# Patient Record
Sex: FEMALE | Race: ASIAN | NOT HISPANIC OR LATINO | URBAN - METROPOLITAN AREA
[De-identification: names, ages, dates, MRNs, and addresses within clinical notes are randomized per-mention and may not be internally consistent; named-entity substitution may affect disease eponyms.]

---

## 2024-10-20 ENCOUNTER — INPATIENT (INPATIENT)
Facility: HOSPITAL | Age: 49
LOS: 1 days | Discharge: HOME | End: 2024-10-22
Attending: HOSPITALIST | Admitting: HOSPITALIST
Payer: COMMERCIAL

## 2024-10-20 VITALS
TEMPERATURE: 98 F | DIASTOLIC BLOOD PRESSURE: 96 MMHG | OXYGEN SATURATION: 100 % | SYSTOLIC BLOOD PRESSURE: 166 MMHG | RESPIRATION RATE: 15 BRPM | HEART RATE: 87 BPM | WEIGHT: 134.92 LBS

## 2024-10-20 PROCEDURE — 99285 EMERGENCY DEPT VISIT HI MDM: CPT

## 2024-10-20 NOTE — ED ADULT TRIAGE NOTE - CHIEF COMPLAINT QUOTE
C/O SOB and chest pain after hearing "bad news" denies SI/HI. No complaints of  headache, nausea, dizziness, vomiting, fever, chills verbalized. phx HTN HLD

## 2024-10-21 DIAGNOSIS — E78.5 HYPERLIPIDEMIA, UNSPECIFIED: ICD-10-CM

## 2024-10-21 DIAGNOSIS — D72.829 ELEVATED WHITE BLOOD CELL COUNT, UNSPECIFIED: ICD-10-CM

## 2024-10-21 DIAGNOSIS — R51.9 HEADACHE, UNSPECIFIED: ICD-10-CM

## 2024-10-21 DIAGNOSIS — R07.9 CHEST PAIN, UNSPECIFIED: ICD-10-CM

## 2024-10-21 DIAGNOSIS — I10 ESSENTIAL (PRIMARY) HYPERTENSION: ICD-10-CM

## 2024-10-21 LAB
A1C WITH ESTIMATED AVERAGE GLUCOSE RESULT: 6.1 % — HIGH (ref 4–5.6)
ADD ON TEST-SPECIMEN IN LAB: SIGNIFICANT CHANGE UP
ALBUMIN SERPL ELPH-MCNC: 4.4 G/DL — SIGNIFICANT CHANGE UP (ref 3.3–5)
ALP SERPL-CCNC: 88 U/L — SIGNIFICANT CHANGE UP (ref 40–120)
ALT FLD-CCNC: 21 U/L — SIGNIFICANT CHANGE UP (ref 4–33)
ANION GAP SERPL CALC-SCNC: 11 MMOL/L — SIGNIFICANT CHANGE UP (ref 7–14)
ANION GAP SERPL CALC-SCNC: 16 MMOL/L — HIGH (ref 7–14)
APPEARANCE UR: CLEAR — SIGNIFICANT CHANGE UP
AST SERPL-CCNC: 21 U/L — SIGNIFICANT CHANGE UP (ref 4–32)
BASOPHILS # BLD AUTO: 0.08 K/UL — SIGNIFICANT CHANGE UP (ref 0–0.2)
BASOPHILS NFR BLD AUTO: 0.5 % — SIGNIFICANT CHANGE UP (ref 0–2)
BILIRUB SERPL-MCNC: 0.2 MG/DL — SIGNIFICANT CHANGE UP (ref 0.2–1.2)
BILIRUB UR-MCNC: NEGATIVE — SIGNIFICANT CHANGE UP
BUN SERPL-MCNC: 11 MG/DL — SIGNIFICANT CHANGE UP (ref 7–23)
BUN SERPL-MCNC: 12 MG/DL — SIGNIFICANT CHANGE UP (ref 7–23)
CALCIUM SERPL-MCNC: 8.9 MG/DL — SIGNIFICANT CHANGE UP (ref 8.4–10.5)
CALCIUM SERPL-MCNC: 9.8 MG/DL — SIGNIFICANT CHANGE UP (ref 8.4–10.5)
CHLORIDE SERPL-SCNC: 104 MMOL/L — SIGNIFICANT CHANGE UP (ref 98–107)
CHLORIDE SERPL-SCNC: 105 MMOL/L — SIGNIFICANT CHANGE UP (ref 98–107)
CK MB CFR SERPL CALC: 5 NG/ML — HIGH
CO2 SERPL-SCNC: 20 MMOL/L — LOW (ref 22–31)
CO2 SERPL-SCNC: 24 MMOL/L — SIGNIFICANT CHANGE UP (ref 22–31)
COLOR SPEC: YELLOW — SIGNIFICANT CHANGE UP
CREAT SERPL-MCNC: 0.65 MG/DL — SIGNIFICANT CHANGE UP (ref 0.5–1.3)
CREAT SERPL-MCNC: 0.65 MG/DL — SIGNIFICANT CHANGE UP (ref 0.5–1.3)
DIFF PNL FLD: NEGATIVE — SIGNIFICANT CHANGE UP
EGFR: 107 ML/MIN/1.73M2 — SIGNIFICANT CHANGE UP
EGFR: 107 ML/MIN/1.73M2 — SIGNIFICANT CHANGE UP
EOSINOPHIL # BLD AUTO: 0.13 K/UL — SIGNIFICANT CHANGE UP (ref 0–0.5)
EOSINOPHIL NFR BLD AUTO: 0.7 % — SIGNIFICANT CHANGE UP (ref 0–6)
ESTIMATED AVERAGE GLUCOSE: 128 — SIGNIFICANT CHANGE UP
GLUCOSE SERPL-MCNC: 129 MG/DL — HIGH (ref 70–99)
GLUCOSE SERPL-MCNC: 136 MG/DL — HIGH (ref 70–99)
GLUCOSE UR QL: NEGATIVE MG/DL — SIGNIFICANT CHANGE UP
HCT VFR BLD CALC: 39.5 % — SIGNIFICANT CHANGE UP (ref 34.5–45)
HCT VFR BLD CALC: 40.2 % — SIGNIFICANT CHANGE UP (ref 34.5–45)
HGB BLD-MCNC: 13 G/DL — SIGNIFICANT CHANGE UP (ref 11.5–15.5)
HGB BLD-MCNC: 13 G/DL — SIGNIFICANT CHANGE UP (ref 11.5–15.5)
IANC: 13.39 K/UL — HIGH (ref 1.8–7.4)
IMM GRANULOCYTES NFR BLD AUTO: 0.5 % — SIGNIFICANT CHANGE UP (ref 0–0.9)
KETONES UR-MCNC: NEGATIVE MG/DL — SIGNIFICANT CHANGE UP
LEUKOCYTE ESTERASE UR-ACNC: NEGATIVE — SIGNIFICANT CHANGE UP
LYMPHOCYTES # BLD AUTO: 17.5 % — SIGNIFICANT CHANGE UP (ref 13–44)
LYMPHOCYTES # BLD AUTO: 3.08 K/UL — SIGNIFICANT CHANGE UP (ref 1–3.3)
MAGNESIUM SERPL-MCNC: 2.1 MG/DL — SIGNIFICANT CHANGE UP (ref 1.6–2.6)
MCHC RBC-ENTMCNC: 26.7 PG — LOW (ref 27–34)
MCHC RBC-ENTMCNC: 26.9 PG — LOW (ref 27–34)
MCHC RBC-ENTMCNC: 32.3 GM/DL — SIGNIFICANT CHANGE UP (ref 32–36)
MCHC RBC-ENTMCNC: 32.9 GM/DL — SIGNIFICANT CHANGE UP (ref 32–36)
MCV RBC AUTO: 81.3 FL — SIGNIFICANT CHANGE UP (ref 80–100)
MCV RBC AUTO: 83.1 FL — SIGNIFICANT CHANGE UP (ref 80–100)
MONOCYTES # BLD AUTO: 0.85 K/UL — SIGNIFICANT CHANGE UP (ref 0–0.9)
MONOCYTES NFR BLD AUTO: 4.8 % — SIGNIFICANT CHANGE UP (ref 2–14)
NEUTROPHILS # BLD AUTO: 13.39 K/UL — HIGH (ref 1.8–7.4)
NEUTROPHILS NFR BLD AUTO: 76 % — SIGNIFICANT CHANGE UP (ref 43–77)
NITRITE UR-MCNC: NEGATIVE — SIGNIFICANT CHANGE UP
NRBC # BLD: 0 /100 WBCS — SIGNIFICANT CHANGE UP (ref 0–0)
NRBC # BLD: 0 /100 WBCS — SIGNIFICANT CHANGE UP (ref 0–0)
NRBC # FLD: 0 K/UL — SIGNIFICANT CHANGE UP (ref 0–0)
NRBC # FLD: 0 K/UL — SIGNIFICANT CHANGE UP (ref 0–0)
PH UR: 6.5 — SIGNIFICANT CHANGE UP (ref 5–8)
PHOSPHATE SERPL-MCNC: 3.4 MG/DL — SIGNIFICANT CHANGE UP (ref 2.5–4.5)
PLATELET # BLD AUTO: 441 K/UL — HIGH (ref 150–400)
PLATELET # BLD AUTO: 494 K/UL — HIGH (ref 150–400)
POTASSIUM SERPL-MCNC: 3.8 MMOL/L — SIGNIFICANT CHANGE UP (ref 3.5–5.3)
POTASSIUM SERPL-MCNC: 4.4 MMOL/L — SIGNIFICANT CHANGE UP (ref 3.5–5.3)
POTASSIUM SERPL-SCNC: 3.8 MMOL/L — SIGNIFICANT CHANGE UP (ref 3.5–5.3)
POTASSIUM SERPL-SCNC: 4.4 MMOL/L — SIGNIFICANT CHANGE UP (ref 3.5–5.3)
PROT SERPL-MCNC: 7.7 G/DL — SIGNIFICANT CHANGE UP (ref 6–8.3)
PROT UR-MCNC: NEGATIVE MG/DL — SIGNIFICANT CHANGE UP
RBC # BLD: 4.84 M/UL — SIGNIFICANT CHANGE UP (ref 3.8–5.2)
RBC # BLD: 4.86 M/UL — SIGNIFICANT CHANGE UP (ref 3.8–5.2)
RBC # FLD: 12.7 % — SIGNIFICANT CHANGE UP (ref 10.3–14.5)
RBC # FLD: 12.9 % — SIGNIFICANT CHANGE UP (ref 10.3–14.5)
SODIUM SERPL-SCNC: 140 MMOL/L — SIGNIFICANT CHANGE UP (ref 135–145)
SODIUM SERPL-SCNC: 140 MMOL/L — SIGNIFICANT CHANGE UP (ref 135–145)
SP GR SPEC: 1.02 — SIGNIFICANT CHANGE UP (ref 1–1.03)
TROPONIN T, HIGH SENSITIVITY RESULT: 34 NG/L — SIGNIFICANT CHANGE UP
TROPONIN T, HIGH SENSITIVITY RESULT: 39 NG/L — SIGNIFICANT CHANGE UP
TROPONIN T, HIGH SENSITIVITY RESULT: 54 NG/L — CRITICAL HIGH
UROBILINOGEN FLD QL: 0.2 MG/DL — SIGNIFICANT CHANGE UP (ref 0.2–1)
WBC # BLD: 10.3 K/UL — SIGNIFICANT CHANGE UP (ref 3.8–10.5)
WBC # BLD: 17.61 K/UL — HIGH (ref 3.8–10.5)
WBC # FLD AUTO: 10.3 K/UL — SIGNIFICANT CHANGE UP (ref 3.8–10.5)
WBC # FLD AUTO: 17.61 K/UL — HIGH (ref 3.8–10.5)

## 2024-10-21 PROCEDURE — 70450 CT HEAD/BRAIN W/O DYE: CPT | Mod: 26,MC

## 2024-10-21 PROCEDURE — 93306 TTE W/DOPPLER COMPLETE: CPT | Mod: 26

## 2024-10-21 PROCEDURE — 71046 X-RAY EXAM CHEST 2 VIEWS: CPT | Mod: 26

## 2024-10-21 RX ORDER — ACETAMINOPHEN 500 MG
650 TABLET ORAL EVERY 6 HOURS
Refills: 0 | Status: DISCONTINUED | OUTPATIENT
Start: 2024-10-21 | End: 2024-10-22

## 2024-10-21 RX ORDER — INFLUENZ VIR VAC TV P-SURF2003 15MCG/.5ML
0.5 SYRINGE (ML) INTRAMUSCULAR ONCE
Refills: 0 | Status: DISCONTINUED | OUTPATIENT
Start: 2024-10-21 | End: 2024-10-22

## 2024-10-21 RX ORDER — METOPROLOL TARTRATE 50 MG
25 TABLET ORAL DAILY
Refills: 0 | Status: DISCONTINUED | OUTPATIENT
Start: 2024-10-21 | End: 2024-10-22

## 2024-10-21 RX ORDER — ASPIRIN/MAG CARB/ALUMINUM AMIN 325 MG
324 TABLET ORAL ONCE
Refills: 0 | Status: COMPLETED | OUTPATIENT
Start: 2024-10-21 | End: 2024-10-21

## 2024-10-21 RX ORDER — METOPROLOL TARTRATE 50 MG
1 TABLET ORAL
Refills: 0 | DISCHARGE

## 2024-10-21 RX ORDER — KETOROLAC TROMETHAMINE 30 MG/ML
30 INJECTION INTRAMUSCULAR; INTRAVENOUS ONCE
Refills: 0 | Status: DISCONTINUED | OUTPATIENT
Start: 2024-10-21 | End: 2024-10-21

## 2024-10-21 RX ADMIN — Medication 25 MILLIGRAM(S): at 12:51

## 2024-10-21 RX ADMIN — Medication 650 MILLIGRAM(S): at 18:07

## 2024-10-21 RX ADMIN — Medication 324 MILLIGRAM(S): at 02:50

## 2024-10-21 RX ADMIN — Medication 650 MILLIGRAM(S): at 10:28

## 2024-10-21 RX ADMIN — Medication 650 MILLIGRAM(S): at 19:07

## 2024-10-21 NOTE — CONSULT NOTE ADULT - NS ATTEND AMEND GEN_ALL_CORE FT
Patient seen and examined. Agree with plan as detailed in PA/NP Note.     Check NST and TTE    Patience Gonzalez MD  Pager: [phone]

## 2024-10-21 NOTE — DISCHARGE NOTE PROVIDER - HOSPITAL COURSE
50 y/o F with PMHx HTN, HLD p/w chest pain and headache today, brought on by emotional stress first trop positive, trending down.  Patient was admitted to telemetry unit for further evaluation. She had a CT of her head in ED for persistent headache which did not show evidence of stroke/bleed.  She had an echocardiogram w/normal EF, no WMA present.  Stress test-----   50 y/o F with PMHx HTN, HLD p/w chest pain and headache today, brought on by emotional stress first trop positive, trending down.  Patient was admitted to telemetry unit for further evaluation. She had a CT of her head in ED for persistent headache which did not show evidence of stroke/bleed.  She had an echocardiogram w/normal EF, no WMA present.  Stress test negative. On 10/22/2024, discussed with cardiology/Dr. Alvarado, patient is medically cleared and optimized for discharge today to home . All medications were reviewed with attending, and any new/required prescriptions were sent to mutually agreed upon pharmacy.

## 2024-10-21 NOTE — ED PROVIDER NOTE - CONSTITUTIONAL, MLM
Refill approved as requested.   normal... Well appearing, awake, alert, oriented to person, place, time/situation and in no apparent distress.

## 2024-10-21 NOTE — ED PROVIDER NOTE - PROGRESS NOTE DETAILS
I spoke to teledoc of the day Dr. Arana. He accepted admission and will see pt tomorrow. he asked I can put admission under evans murray md. Felipa - pt reassesed. chest pain free. headache improved. 12lead x 2 normal.

## 2024-10-21 NOTE — H&P ADULT - PROBLEM SELECTOR PLAN 4
2nd trop negative  defer to cardiology   chest pain free at this time fasting lipid panel  continue atorvastatin

## 2024-10-21 NOTE — ED ADULT NURSE NOTE - OBJECTIVE STATEMENT
Patient received in intake. A&O4. ambulatory. Past medical history of HLD/HTN. Came into ED with complaints of chest pain. States chest pain started after hearing "bad news". Patient appears tearful while answering questions. Respirations even and unlabored. Denies SOB, N/V/D,fevers. Respirations even and unlabored. Denies SI/HI. No signs of acute distress noted. 20g IV placed left hand. labs drawn and sent.

## 2024-10-21 NOTE — H&P ADULT - NSHPPHYSICALEXAM_GEN_ALL_CORE
PHYSICAL EXAM: vital signs noted on Sunrise  in no apparent distress  HEENT: MYRIAM EOMI  Neck: Supple, no JVD, no thyromegaly  Lungs: no wheeze, no crackles  CVS: S1 S2 no M/R/G  Abdomen: no tenderness, no organomegaly, BS present  Neuro: AO x 3 no focal weakness, no sensory abnormalities  Psych: appropriate affect  Skin: warm, dry  Ext: no cyanosis or clubbing, no edema  Msk: no joint swelling or deformities  Back: no CVA tenderness, no kyphosis/scoliosis

## 2024-10-21 NOTE — H&P ADULT - PROBLEM SELECTOR PLAN 2
continue metoprolol  will adjust meds up as needed will monitor  unclear etiology  no evidence of localizing s/s  will repeat   urinalysis

## 2024-10-21 NOTE — CONSULT NOTE ADULT - SUBJECTIVE AND OBJECTIVE BOX
date of consult 10/21/24    HISTORY OF PRESENT ILLNESS: HPI:  48 y/o F with PMHx HTN and HLD p/w chest pain and headache today.  Pt reports at  8 PM this evening, she reviewed upsetting news that her daughter was moving away from home.  Pt reports she very emotional with which she developed generalized headache, 8/10 in intensity, for which she was brought to the emergency department.  Pt reports en route to hospital, she developed chest heaviness and dyspnea. Currently chest pain free, just c/o severe headache    Denies hx CAD, MI, CHF, reports normal cardiac workup with OP Cardiologist 1.5-2 years ago.    PAST MEDICAL & SURGICAL HISTORY:  HTN (hypertension)    HLD (hyperlipidemia)      MEDICATIONS  (STANDING):  atorvastatin 10 milliGRAM(s) Oral at bedtime  influenza   Vaccine 0.5 milliLiter(s) IntraMuscular once  metoprolol succinate ER 25 milliGRAM(s) Oral daily      Allergies  No Known Allergies      FAMILY HISTORY:  Family history of diabetes mellitus (DM) (Father, Mother)  Noncontributory for premature coronary disease or sudden cardiac death    SOCIAL HISTORY:    [ x] Non-smoker  [ ] Smoker  [ ] Alcohol    FLU VACCINE THIS YEAR STARTS IN AUGUST:  [ ] Yes    [ ] No    IF OVER 65 HAVE YOU EVER HAD A PNA VACCINE:  [ ] Yes    [ ] No       [ ] N/A      REVIEW OF SYSTEMS:  [ ]chest pain  [  ]shortness of breath  [  ]palpitations  [  ]syncope  [ ]near syncope [ ]upper extremity weakness   [ ] lower extremity weakness  [  ]diplopia  [  ]altered mental status   [  ]fevers  [ ]chills [ ]nausea  [ ]vomiting  [  ]dysphagia    [ ]abdominal pain  [ ]melena  [ ]BRBPR    [  ]epistaxis  [  ]rash    [ ]lower extremity edema        [ x] All others negative	  [ ] Unable to obtain      LABS:	 	    CARDIAC MARKERS:  CARDIAC MARKERS ( 21 Oct 2024 10:20 )  x     / x     / x     / x     / 5.0 ng/mL  CARDIAC MARKERS ( 21 Oct 2024 00:24 )  x     / x     / x     / x     / 4.8 ng/mL    TROP T 54, 39, 34                          13.0   10.30 )-----------( 441      ( 21 Oct 2024 10:20 )             40.2     140  |  105  |  11  ----------------------------<  129[H]  4.4   |  24  |  0.65    Ca    8.9      21 Oct 2024 10:20  Phos  3.4     10-21  Mg     2.10     10-21    TPro  7.7  /  Alb  4.4  /  TBili  0.2  /  DBili  x   /  AST  21  /  ALT  21  /  AlkPhos  88  10-21    Creatinine Trend: 0.65<--, 0.65<--      PHYSICAL EXAM:  T(C): 36.4 (10-21-24 @ 08:00), Max: 36.9 (10-21-24 @ 04:59)  HR: 59 (10-21-24 @ 08:00) (59 - 87)  BP: 146/90 (10-21-24 @ 08:00) (128/85 - 166/96)  RR: 17 (10-21-24 @ 08:00) (15 - 17)  SpO2: 100% (10-21-24 @ 08:00) (100% - 100%)  Wt(kg): --   BMI (kg/m2): 25.5 (10-21-24 @ 08:00)    Gen: Appears well in NAD  HEENT:  (-)icterus (-)pallor  CV: N S1 S2 1/6 GABBIE (+)2 Pulses B/l  Resp:  Clear to ausculatation B/L, normal effort  GI: (+) BS Soft, NT, ND  Lymph:  (-)Edema, (-)obvious lymphadenopathy  Skin: Warm to touch, Normal turgor  Psych: Appropriate mood and affect    TELEMETRY: 	SR      ECG:  	NSR    < from: CT Head No Cont (10.21.24 @ 01:47) >  IMPRESSION:  No acute intracranial hemorrhage, mass effect, or midline shift.    < end of copied text >      ASSESSMENT/PLAN: 48 y/o F with PMHx HTN and HLD p/w chest pain and headache today.    --Trop T downtrending  --Check TTE and NST  --Cont lipitor for HLD  --unclear why on Toprol XL, cont for now but may need better BP management with different medications  --Headache ongoing, denies hx Migraines- Neuro consulted    close OP F/U with OP Cardiologist      Lisy HASSAN  [phone]

## 2024-10-21 NOTE — PATIENT PROFILE ADULT - ..
Nursing Note by Yonatan Romero RN at 18 04:02 PM     Author:  Yonatan Romero RN Service:  (none) Author Type:  Registered Nurse     Filed:  18 04:03 PM Encounter Date:  2018 Status:  Signed     :  Yonatan Romero RN (Registered Nurse)            4:02 PM  Chief Complaint     Patient presents with     • Pain      Neck pain with rotation of the neck, X today, pain is 7/10 with movement     Patient brought to exam room.  Electronically Signed by:    Yonatan Romero RN , 2018  Patient's name,  and allergies verified with[RC1.1T] patient[RC1.1M].  Last visit with [facility] was on No match found  Last visit with [facility] was on 2015 at  8:35 AM in [facility]  Match done based on reference date of today 18  Ankit Cook was offered treatment for pain control:[RC1.1T] No.[RC1.1M]      Revision History        User Key Date/Time User Provider Type Action    > RC1.1 18 04:03 PM Yonatan Romero RN Registered Nurse Sign    M - Manual, T - Template             21-Oct-2024 10:45:46

## 2024-10-21 NOTE — ED PROVIDER NOTE - OBJECTIVE STATEMENT
Pt is a 50 y/o F with PMHx HTN, HLD p/w chest pain and headache today.  Pt reports at  8 PM this evening, she reviewed upsetting news that her daughter was moving away from home.  Pt reports she very emotional with which she developed generalized headache, 8/10 in intensity, for which she was brought to the emergency department.  Pt reports en route to hospital, she developed chest heaviness and dyspnea.  Pt reports headache and chest pain much improved at this time.  Pt denies any fevers, chills, palpitations, syncope, head trauma, neck pain/stiffness, back pain, calf pain/swelling, h/o dvt/pe, hemoptysis, h/o malignancy, recent surgeries, exogenous hormone use, illicit drug use, ETOH abuse, first degree family history of heart disease.

## 2024-10-21 NOTE — DISCHARGE NOTE PROVIDER - NSDCMRMEDTOKEN_GEN_ALL_CORE_FT
atorvastatin 10 mg oral tablet: 1 tab(s) orally once a day (at bedtime)  metoprolol succinate 25 mg oral capsule, extended release: 1 cap(s) orally once a day

## 2024-10-21 NOTE — H&P ADULT - NSHPADDITIONALINFOADULT_GEN_ALL_CORE
discussed with patient, expresses understanding of treatment plans.  discussed with patient's family at bedside in detail

## 2024-10-21 NOTE — DISCHARGE NOTE PROVIDER - CARE PROVIDER_API CALL
Patience Gonzalez  Interventional Cardiology  [address] [place] [address], [id number]  [place] [address]  Phone: [phone]  Fax: [phone]  Established Patient  Follow Up Time: Routine

## 2024-10-21 NOTE — ED PROVIDER NOTE - ATTENDING APP SHARED VISIT CONTRIBUTION OF CARE
I, Morgan Leo, DO personally saw the patient with SYDNEE.  I have personally performed a face to face diagnostic evaluation on this patient.  I have reviewed the SYDNEE note and agree with the history, exam, and plan of care, except as noted.  I personally saw the patient and performed a substantive portion of the visit including all aspects of the medical decision making.

## 2024-10-21 NOTE — ED PROVIDER NOTE - CLINICAL SUMMARY MEDICAL DECISION MAKING FREE TEXT BOX
Pt is a 48 y/o F with PMHx HTN, HLD p/w chest pain and headache today.  Pt reports at  8 PM this evening, she reviewed upsetting news that her daughter was moving away from home.  Pt reports she very emotional with which she developed generalized headache, 8/10 in intensity, for which she was brought to the emergency department.  Pt reports en route to hospital, she developed chest heaviness and dyspnea.  Pt reports headache and chest pain much improved at this time.  Pt denies any fevers, chills, palpitations, syncope, head trauma, neck pain/stiffness, back pain, calf pain/swelling, h/o dvt/pe, hemoptysis, h/o malignancy, recent surgeries, exogenous hormone use, illicit drug use, ETOH abuse, first degree family history of heart disease.  This is a pt with headache without neurological deficits.  This is a pt with chest pain and dyspnea.  Very low clinical suspicion for disease processes including but not limited to PE, aortic dissection, meningitis.  Plan to order labs, trop, CT head, ekg.  Disposition pending work up.

## 2024-10-21 NOTE — H&P ADULT - HISTORY OF PRESENT ILLNESS
50 y/o F with PMHx HTN, HLD p/w chest pain and headache today.  Pt reports at  8 PM this evening, she reviewed upsetting news that her daughter was moving away from home.  Pt reports she very emotional with which she developed generalized headache, 8/10 in intensity, for which she was brought to the emergency department.  Pt reports en route to hospital, she developed chest heaviness and dyspnea. Currently chest pain free, just c/o severe headache

## 2024-10-21 NOTE — ED PROVIDER NOTE - PHYSICAL EXAMINATION
-Cranial Nerves:  --CN II: PERRLA  --CN III, IV, VI: EOMI b/l  --CN V1-3: Facial sensation intact to touch  --CN VII: No facial asymmetry or droop  --CN VIII: Hearing intact to rubbing fingers b/l  --CN IX, X: Palate elevates symmetrically. Normal phonation  --CN XI: Heading turning and shoulder shrug intact b/l  --CN XII: Tongue midline with normal movements     Normal bilateral FTN.  Rapid alternating movements intact.  Negative rhomberg.     No LE edema.  No calf tenderness.  No palpable cords.  2+ pulses bilaterally.

## 2024-10-21 NOTE — H&P ADULT - NSHPREVIEWOFSYSTEMS_GEN_ALL_CORE
Gen: no loss of wt no loss of appetite  ENT: no dizziness no hearing loss  Ophth: no blurring of vision no loss of vision  Resp: No cough no sputum production  CVS: see above HPI   GI: no nausea, vomiting or diarrhea   : no dysuria, hematuria  Endo: no polyuria no excessive sweating  Neuro: no weakness no paresthesias see above HPI   Heme: No petechiae no easy bruising  Msk: No joint pain no swelling  Skin: No rash no itching

## 2024-10-21 NOTE — H&P ADULT - ASSESSMENT
48 y/o F with PMHx HTN, HLD p/w chest pain and headache today, brought on by emotional stress first trop positive, trending down

## 2024-10-21 NOTE — DISCHARGE NOTE PROVIDER - NSDCCPCAREPLAN_GEN_ALL_CORE_FT
PRINCIPAL DISCHARGE DIAGNOSIS  Diagnosis: Chest pain  Assessment and Plan of Treatment: You were admitted to the hospital for chest pain.  Your EKG and echocardiogram were normal      SECONDARY DISCHARGE DIAGNOSES  Diagnosis: HLD (hyperlipidemia)  Assessment and Plan of Treatment: Continue prescribed medications to control your cholesterol levels and a DASH (Low fat/salt) diet. Follow up with your primary care provider upon discharge for further management and monitoring of cholesterol levels.      Diagnosis: HTN (hypertension)  Assessment and Plan of Treatment: Continue current blood pressure medication regimen as directed. Monitor for any visual changes, headaches or dizziness.  Monitor blood pressure regularly.  Follow up with your PCP for further management for high blood pressure, please call to make appointment within 1 week of discharge     PRINCIPAL DISCHARGE DIAGNOSIS  Diagnosis: Chest pain  Assessment and Plan of Treatment: You were admitted to the hospital for chest pain. Your EKG and echocardiogram were normal. Your stress test was negative. Please follow up with your primary care physician/cardiologist regarding your hospitalization and for further monitoring/management.      SECONDARY DISCHARGE DIAGNOSES  Diagnosis: HTN (hypertension)  Assessment and Plan of Treatment: Continue current blood pressure medication regimen as directed. Monitor for any visual changes, headaches or dizziness.  Monitor blood pressure regularly.  Follow up with your PCP for further management for high blood pressure, please call to make appointment within 1 week of discharge    Diagnosis: HLD (hyperlipidemia)  Assessment and Plan of Treatment: Continue prescribed medications to control your cholesterol levels and a DASH (Low fat/salt) diet. Follow up with your primary care provider upon discharge for further management and monitoring of cholesterol levels.

## 2024-10-22 VITALS
DIASTOLIC BLOOD PRESSURE: 77 MMHG | HEART RATE: 72 BPM | OXYGEN SATURATION: 100 % | RESPIRATION RATE: 17 BRPM | SYSTOLIC BLOOD PRESSURE: 128 MMHG | TEMPERATURE: 98 F

## 2024-10-22 LAB
A1C WITH ESTIMATED AVERAGE GLUCOSE RESULT: 6.2 % — HIGH (ref 4–5.6)
ANION GAP SERPL CALC-SCNC: 13 MMOL/L — SIGNIFICANT CHANGE UP (ref 7–14)
BUN SERPL-MCNC: 12 MG/DL — SIGNIFICANT CHANGE UP (ref 7–23)
CALCIUM SERPL-MCNC: 8.7 MG/DL — SIGNIFICANT CHANGE UP (ref 8.4–10.5)
CHLORIDE SERPL-SCNC: 107 MMOL/L — SIGNIFICANT CHANGE UP (ref 98–107)
CHOLEST SERPL-MCNC: 166 MG/DL — SIGNIFICANT CHANGE UP
CO2 SERPL-SCNC: 20 MMOL/L — LOW (ref 22–31)
CREAT SERPL-MCNC: 0.61 MG/DL — SIGNIFICANT CHANGE UP (ref 0.5–1.3)
EGFR: 109 ML/MIN/1.73M2 — SIGNIFICANT CHANGE UP
ESTIMATED AVERAGE GLUCOSE: 131 — SIGNIFICANT CHANGE UP
GLUCOSE SERPL-MCNC: 110 MG/DL — HIGH (ref 70–99)
HCT VFR BLD CALC: 37.9 % — SIGNIFICANT CHANGE UP (ref 34.5–45)
HDLC SERPL-MCNC: 38 MG/DL — LOW
HGB BLD-MCNC: 12 G/DL — SIGNIFICANT CHANGE UP (ref 11.5–15.5)
LIPID PNL WITH DIRECT LDL SERPL: 104 MG/DL — HIGH
MAGNESIUM SERPL-MCNC: 2.1 MG/DL — SIGNIFICANT CHANGE UP (ref 1.6–2.6)
MCHC RBC-ENTMCNC: 26 PG — LOW (ref 27–34)
MCHC RBC-ENTMCNC: 31.7 GM/DL — LOW (ref 32–36)
MCV RBC AUTO: 82 FL — SIGNIFICANT CHANGE UP (ref 80–100)
NON HDL CHOLESTEROL: 128 MG/DL — SIGNIFICANT CHANGE UP
NRBC # BLD: 0 /100 WBCS — SIGNIFICANT CHANGE UP (ref 0–0)
NRBC # FLD: 0 K/UL — SIGNIFICANT CHANGE UP (ref 0–0)
PHOSPHATE SERPL-MCNC: 3.5 MG/DL — SIGNIFICANT CHANGE UP (ref 2.5–4.5)
PLATELET # BLD AUTO: 405 K/UL — HIGH (ref 150–400)
POTASSIUM SERPL-MCNC: 4.2 MMOL/L — SIGNIFICANT CHANGE UP (ref 3.5–5.3)
POTASSIUM SERPL-SCNC: 4.2 MMOL/L — SIGNIFICANT CHANGE UP (ref 3.5–5.3)
RBC # BLD: 4.62 M/UL — SIGNIFICANT CHANGE UP (ref 3.8–5.2)
RBC # FLD: 13.1 % — SIGNIFICANT CHANGE UP (ref 10.3–14.5)
SODIUM SERPL-SCNC: 140 MMOL/L — SIGNIFICANT CHANGE UP (ref 135–145)
TRIGL SERPL-MCNC: 121 MG/DL — SIGNIFICANT CHANGE UP
TSH SERPL-MCNC: 2.09 UIU/ML — SIGNIFICANT CHANGE UP (ref 0.27–4.2)
VIT B12 SERPL-MCNC: 425 PG/ML — SIGNIFICANT CHANGE UP (ref 200–900)
WBC # BLD: 9.28 K/UL — SIGNIFICANT CHANGE UP (ref 3.8–10.5)
WBC # FLD AUTO: 9.28 K/UL — SIGNIFICANT CHANGE UP (ref 3.8–10.5)

## 2024-10-22 PROCEDURE — 78451 HT MUSCLE IMAGE SPECT SING: CPT | Mod: 26

## 2024-10-22 PROCEDURE — 93016 CV STRESS TEST SUPVJ ONLY: CPT | Mod: GC

## 2024-10-22 PROCEDURE — 93018 CV STRESS TEST I&R ONLY: CPT | Mod: GC

## 2024-10-22 RX ADMIN — Medication 25 MILLIGRAM(S): at 05:04

## 2024-10-22 NOTE — PROGRESS NOTE ADULT - ASSESSMENT
50 y/o F with PMHx HTN, HLD p/w chest pain and headache today, brought on by emotional stress first trop positive, trending down

## 2024-10-22 NOTE — PROGRESS NOTE ADULT - SUBJECTIVE AND OBJECTIVE BOX
Date of service 10/22/24    no chest pain or SOB, headache improved, ROS otherwise negative    MEDS  acetaminophen     Tablet .. 650 milliGRAM(s) Oral every 6 hours PRN  atorvastatin 10 milliGRAM(s) Oral at bedtime  influenza   Vaccine 0.5 milliLiter(s) IntraMuscular once  metoprolol succinate ER 25 milliGRAM(s) Oral daily                            12.0   9.28  )-----------( 405      ( 22 Oct 2024 05:56 )             37.9       140  |  107  |  12  ----------------------------<  110[H]  4.2   |  20[L]  |  0.61    Ca    8.7      22 Oct 2024 05:56  Phos  3.5     10-22  Mg     2.10     10-22    TPro  7.7  /  Alb  4.4  /  TBili  0.2  /  DBili  x   /  AST  21  /  ALT  21  /  AlkPhos  88  10-21      CARDIAC MARKERS ( 21 Oct 2024 10:20 )  x     / x     / x     / x     / 5.0 ng/mL  CARDIAC MARKERS ( 21 Oct 2024 00:24 )  x     / x     / x     / x     / 4.8 ng/mL        T(C): 36.7 (10-22-24 @ 10:57), Max: 37.1 (10-21-24 @ 20:34)  HR: 72 (10-22-24 @ 10:57) (63 - 72)  BP: 128/77 (10-22-24 @ 10:57) (110/68 - 128/77)  RR: 17 (10-22-24 @ 10:57) (17 - 18)  SpO2: 100% (10-22-24 @ 10:57) (99% - 100%)  Wt(kg): --    I&O's Summary      General: Well nourished in no acute distress. Alert and Oriented * 3.   Head: Normocephalic and atraumatic.   Neck: No JVD. No bruits. Supple. Does not appear to be enlarged.   Cardiovascular: + S1,S2 ; RRR Soft systolic murmur at the left lower sternal border. No rubs noted.    Lungs: CTA b/l. No rhonchi, rales or wheezes.   Abdomen: + BS, soft. Non tender. Non distended. No rebound. No guarding.   Extremities: No clubbing/cyanosis/edema.   Neurologic: Moves all four extremities. Full range of motion.   Skin: Warm and moist. The patient's skin has normal elasticity and good skin turgor.   Psychiatric: Appropriate mood and affect.  Musculoskeletal: Normal range of motion, normal strength    DATA    TELEMETRY: 	SR      ECG:  	NSR    < from: CT Head No Cont (10.21.24 @ 01:47) >  IMPRESSION:  No acute intracranial hemorrhage, mass effect, or midline shift.    < end of copied text >      < from: TTE W or WO Ultrasound Enhancing Agent (10.21.24 @ 15:21) >  CONCLUSIONS:      1. Left ventricular cavity is normal in size. Left ventricular wall thickness is normal. Left ventricular systolic function is normal with an ejection fraction of 69 % by Fuentes's method of disks. There are no regional wall motion abnormalities seen.   2. Normal left ventricular diastolic function.   3. Normal right ventricular cavity size and normal right ventricular systolic function.   4. Structurally normal mitral valve with normal leaflet excursion. There is trace mitral regurgitation.    < end of copied text >      ASSESSMENT/PLAN: 50 y/o F with PMHx HTN and HLD p/w chest pain and headache today.    --Trop T downtrending  --TTE with Normal LV function  --Check NST  --Cont lipitor for HLD  --unclear why on Toprol XL, cont for now but may need better BP management with different medications  --Headache ongoing, denies hx Migraines- Neuro consult appreciated    close OP F/U with OP Cardiologist      Lisy HASSAN  [phone]               
Patient is a 49y old  Female who presents with a chief complaint of chest pain (22 Oct 2024 12:02)      DATE OF SERVICE: 10-22-24 @ 13:52    SUBJECTIVE / OVERNIGHT EVENTS: overnight events noted    ROS:  Resp: No cough no sputum production  CVS: No chest pain no palpitations no orthopnea  GI: no N/V/D          MEDICATIONS  (STANDING):  atorvastatin 10 milliGRAM(s) Oral at bedtime  influenza   Vaccine 0.5 milliLiter(s) IntraMuscular once  metoprolol succinate ER 25 milliGRAM(s) Oral daily    MEDICATIONS  (PRN):  acetaminophen     Tablet .. 650 milliGRAM(s) Oral every 6 hours PRN Temp greater or equal to 38C (100.4F), Moderate Pain (4 - 6)        CAPILLARY BLOOD GLUCOSE        I&O's Summary      Vital Signs Last 24 Hrs  T(C): 36.7 (22 Oct 2024 10:57), Max: 37.1 (21 Oct 2024 20:34)  T(F): 98.1 (22 Oct 2024 10:57), Max: 98.8 (21 Oct 2024 20:34)  HR: 72 (22 Oct 2024 10:57) (63 - 72)  BP: 128/77 (22 Oct 2024 10:57) (110/68 - 128/77)  BP(mean): --  RR: 17 (22 Oct 2024 10:57) (17 - 18)  SpO2: 100% (22 Oct 2024 10:57) (99% - 100%)    PHYSICAL EXAM:  GENERAL: in no distress  HEAD:  Atraumatic, Normocephalic  EYES: EOMI, PERRLA, sclera clear  NECK: Supple, No JVD  CHEST/LUNG: clear b/l, no wheeze  HEART: S1 S2; no murmurs appreciated  ABDOMEN: Soft, Nontender, Bowel sounds present  EXTREMITIES:  No clubbing or cyanosis,  no edema  NEUROLOGY: AO x 3 non-focal  SKIN: No rashes or lesions    LABS:                        12.0   9.28  )-----------( 405      ( 22 Oct 2024 05:56 )             37.9     10-22    140  |  107  |  12  ----------------------------<  110[H]  4.2   |  20[L]  |  0.61    Ca    8.7      22 Oct 2024 05:56  Phos  3.5     10-22  Mg     2.10     10-22    TPro  7.7  /  Alb  4.4  /  TBili  0.2  /  DBili  x   /  AST  21  /  ALT  21  /  AlkPhos  88  10-21      CARDIAC MARKERS ( 21 Oct 2024 10:20 )  x     / x     / x     / x     / 5.0 ng/mL  CARDIAC MARKERS ( 21 Oct 2024 00:24 )  x     / x     / x     / x     / 4.8 ng/mL      Urinalysis Basic - ( 22 Oct 2024 05:56 )    Color: x / Appearance: x / SG: x / pH: x  Gluc: 110 mg/dL / Ketone: x  / Bili: x / Urobili: x   Blood: x / Protein: x / Nitrite: x   Leuk Esterase: x / RBC: x / WBC x   Sq Epi: x / Non Sq Epi: x / Bacteria: x          All consultant(s) notes reviewed and care discussed with other providers        Contact Number, Dr Paulino [phone]

## 2024-10-22 NOTE — PROGRESS NOTE ADULT - NS ATTEND AMEND GEN_ALL_CORE FT
patient in better spirits today.  awaiting dc timing. ambulating in hallway.  awaiting stress testing.

## 2024-10-22 NOTE — PROGRESS NOTE ADULT - PROBLEM SELECTOR PLAN 1
at bedside   likely stress induced now fully resolved  s/p Toradol x 1 dose  neuro input appreciated  agree that inpatient MR not required at this time

## 2024-10-22 NOTE — CONSULT NOTE ADULT - SUBJECTIVE AND OBJECTIVE BOX
Neurology Consult    Reason for Consult: Patient is a 49y old  Female who presents with a chief complaint of chest pain (21 Oct 2024 18:10)      HPI:  50 y/o F with PMHx HTN, HLD p/w chest pain and headache today.  Pt reports at  8 PM this evening, she reviewed upsetting news that her daughter was moving away from home.  Pt reports she very emotional with which she developed generalized headache, 8/10 in intensity, for which she was brought to the emergency department.  Pt reports en route to hospital, she developed chest heaviness and dyspnea. Currently chest pain free, just c/o severe headache       PAST MEDICAL & SURGICAL HISTORY:  HTN (hypertension)      HLD (hyperlipidemia)          Allergies: Allergies    No Known Allergies    Intolerances        Social History: Denies toxic habits including tobacco, ETOH or illicit drugs.    Family History: FAMILY HISTORY:  Family history of diabetes mellitus (DM) (Father, Mother)    . No family history of strokes    Medications: MEDICATIONS  (STANDING):  atorvastatin 10 milliGRAM(s) Oral at bedtime  influenza   Vaccine 0.5 milliLiter(s) IntraMuscular once  metoprolol succinate ER 25 milliGRAM(s) Oral daily    MEDICATIONS  (PRN):  acetaminophen     Tablet .. 650 milliGRAM(s) Oral every 6 hours PRN Temp greater or equal to 38C (100.4F), Moderate Pain (4 - 6)      Review of Systems:  CONSTITUTIONAL:  No weight loss, fever, chills, weakness or fatigue.  HEENT:  Eyes:  No visual loss, blurred vision, double vision or yellow sclera. Ears, Nose, Throat:  No hearing loss, sneezing, congestion, runny nose or sore throat.  SKIN:  No rash or itching.  CARDIOVASCULAR:  No chest pain, chest pressure or chest discomfort. No palpitations or edema.  RESPIRATORY:  No shortness of breath, cough or sputum.  GASTROINTESTINAL:  No anorexia, nausea, vomiting or diarrhea. No abdominal pain or blood.  GENITOURINARY:  No burning on urination or incontinence   NEUROLOGICAL:  No headache, dizziness, syncope, paralysis, ataxia, numbness or tingling in the extremities. No change in bowel or bladder control. no limb weakness. no vision changes.   MUSCULOSKELETAL:  No muscle, back pain, joint pain or stiffness.  HEMATOLOGIC:  No anemia, bleeding or bruising.  LYMPHATICS:  No enlarged nodes. No history of splenectomy.  PSYCHIATRIC:  No history of depression or anxiety.  ENDOCRINOLOGIC:  No reports of sweating, cold or heat intolerance. No polyuria or polydipsia.      Vitals:  Vital Signs Last 24 Hrs  T(C): 36.7 (22 Oct 2024 10:57), Max: 37.1 (21 Oct 2024 20:34)  T(F): 98.1 (22 Oct 2024 10:57), Max: 98.8 (21 Oct 2024 20:34)  HR: 72 (22 Oct 2024 10:57) (63 - 72)  BP: 128/77 (22 Oct 2024 10:57) (110/68 - 128/77)  BP(mean): --  RR: 17 (22 Oct 2024 10:57) (17 - 18)  SpO2: 100% (22 Oct 2024 10:57) (99% - 100%)    Parameters below as of 22 Oct 2024 10:57  Patient On (Oxygen Delivery Method): room air        General Exam:   General Appearance: Appropriately dressed and in no acute distress       Head: Normocephalic, atraumatic and no dysmorphic features  Ear, Nose, and Throat: Moist mucous membranes  CVS: S1S2+  Resp: No SOB, no wheeze or rhonchi  GI: soft NT/ND  Extremities: No edema or cyanosis  Skin: No bruises or rashes     Neurological Exam:  Mental Status: Awake, alert and oriented x 3.  Able to follow simple and complex verbal commands. Able to name and repeat. fluent speech. No obvious aphasia or dysarthria noted.   Cranial Nerves: PERRL, EOMI, VFFC, sensation V1-V3 intact,  no obvious facial asymmetry, equal elevation of palate, scm/trap 5/5, tongue is midline on protrusion. hearing is grossly intact.   Motor: Normal bulk, tone and strength throughout. Fine finger movements were intact and symmetric. no tremors or drift noted.    Sensation: Intact to light touch and pinprick throughout. no right/left confusion. no extinction to tactile on DSS.   Reflexes: 1+ throughout at biceps, brachioradialis, triceps, patellars and ankles bilaterally and equal. No clonus. R toe and L toe were both downgoing.  Coordination: No dysmetria on FNF or HKS  Gait: deferred    Data/Labs/Imaging which I personally reviewed.     Labs:     CBC Full  -  ( 22 Oct 2024 05:56 )  WBC Count : 9.28 K/uL  RBC Count : 4.62 M/uL  Hemoglobin : 12.0 g/dL  Hematocrit : 37.9 %  Platelet Count - Automated : 405 K/uL  Mean Cell Volume : 82.0 fL  Mean Cell Hemoglobin : 26.0 pg  Mean Cell Hemoglobin Concentration : 31.7 gm/dL  Auto Neutrophil # : x  Auto Lymphocyte # : x  Auto Monocyte # : x  Auto Eosinophil # : x  Auto Basophil # : x  Auto Neutrophil % : x  Auto Lymphocyte % : x  Auto Monocyte % : x  Auto Eosinophil % : x  Auto Basophil % : x    10-22    140  |  107  |  12  ----------------------------<  110[H]  4.2   |  20[L]  |  0.61    Ca    8.7      22 Oct 2024 05:56  Phos  3.5     10-22  Mg     2.10     10-22    TPro  7.7  /  Alb  4.4  /  TBili  0.2  /  DBili  x   /  AST  21  /  ALT  21  /  AlkPhos  88  10-21    LIVER FUNCTIONS - ( 21 Oct 2024 00:24 )  Alb: 4.4 g/dL / Pro: 7.7 g/dL / ALK PHOS: 88 U/L / ALT: 21 U/L / AST: 21 U/L / GGT: x             Urinalysis Basic - ( 22 Oct 2024 05:56 )    Color: x / Appearance: x / SG: x / pH: x  Gluc: 110 mg/dL / Ketone: x  / Bili: x / Urobili: x   Blood: x / Protein: x / Nitrite: x   Leuk Esterase: x / RBC: x / WBC x   Sq Epi: x / Non Sq Epi: x / Bacteria: x          c< from: CT Head No Cont (10.21.24 @ 01:47) >    ACC: [id number] EXAM:  CT BRAIN   ORDERED BY: HERRERA CLIFTON     PROCEDURE DATE:  10/21/2024          INTERPRETATION:  CLINICAL INFORMATION: headache    COMPARISON: None.    CONTRAST:  IV Contrast: None  Complications: None    TECHNIQUE:  Serial axial images were obtained from the skull base to the   vertex using multi-slice helical technique. Sagittal and coronal   reformats were obtained.    FINDINGS:    VENTRICLES AND SULCI: Normal in size and configuration.  INTRA-AXIAL: No mass effect, acute hemorrhage, or midline shift.  EXTRA-AXIAL: No mass or fluid collection. Basal cisterns are normal in   appearance.    VISUALIZED SINUSES:  Clear.  TYMPANOMASTOID CAVITIES:  Clear.  VISUALIZED ORBITS: Normal.  CALVARIUM: Intact.    MISCELLANEOUS: None.    IMPRESSION:  No acute intracranial hemorrhage, mass effect, or midline shift.        --- End of Report ---          < end of copied text >   Neurology Consult    Reason for Consult: Patient is a 49y old  Female who presents with a chief complaint of chest pain (21 Oct 2024 18:10)      HPI:  50 y/o F with PMHx HTN, HLD p/w chest pain and headache today.  Pt reports at  8 PM this evening, she reviewed upsetting news that her daughter was moving away from home.  Pt reports she very emotional with which she developed generalized headache, 8/10 in intensity, for which she was brought to the emergency department.  Pt reports en route to hospital, she developed chest heaviness and dyspnea. Currently chest pain free, just c/o severe headache, now resolved to 2/10       PAST MEDICAL & SURGICAL HISTORY:  HTN (hypertension)      HLD (hyperlipidemia)          Allergies: Allergies    No Known Allergies    Intolerances        Social History: Denies toxic habits including tobacco, ETOH or illicit drugs.    Family History: FAMILY HISTORY:  Family history of diabetes mellitus (DM) (Father, Mother)    . No family history of strokes    Medications: MEDICATIONS  (STANDING):  atorvastatin 10 milliGRAM(s) Oral at bedtime  influenza   Vaccine 0.5 milliLiter(s) IntraMuscular once  metoprolol succinate ER 25 milliGRAM(s) Oral daily    MEDICATIONS  (PRN):  acetaminophen     Tablet .. 650 milliGRAM(s) Oral every 6 hours PRN Temp greater or equal to 38C (100.4F), Moderate Pain (4 - 6)      Review of Systems:  CONSTITUTIONAL:  No weight loss, fever, chills, weakness or fatigue.  HEENT:  Eyes:  No visual loss, blurred vision, double vision or yellow sclera. Ears, Nose, Throat:  No hearing loss, sneezing, congestion, runny nose or sore throat.  SKIN:  No rash or itching.  CARDIOVASCULAR:  No chest pain, chest pressure or chest discomfort. No palpitations or edema.  RESPIRATORY:  No shortness of breath, cough or sputum.  GASTROINTESTINAL:  No anorexia, nausea, vomiting or diarrhea. No abdominal pain or blood.  GENITOURINARY:  No burning on urination or incontinence   NEUROLOGICAL:  No headache, dizziness, syncope, paralysis, ataxia, numbness or tingling in the extremities. No change in bowel or bladder control. no limb weakness. no vision changes.   MUSCULOSKELETAL:  No muscle, back pain, joint pain or stiffness.  HEMATOLOGIC:  No anemia, bleeding or bruising.  LYMPHATICS:  No enlarged nodes. No history of splenectomy.  PSYCHIATRIC:  No history of depression or anxiety.  ENDOCRINOLOGIC:  No reports of sweating, cold or heat intolerance. No polyuria or polydipsia.      Vitals:  Vital Signs Last 24 Hrs  T(C): 36.7 (22 Oct 2024 10:57), Max: 37.1 (21 Oct 2024 20:34)  T(F): 98.1 (22 Oct 2024 10:57), Max: 98.8 (21 Oct 2024 20:34)  HR: 72 (22 Oct 2024 10:57) (63 - 72)  BP: 128/77 (22 Oct 2024 10:57) (110/68 - 128/77)  BP(mean): --  RR: 17 (22 Oct 2024 10:57) (17 - 18)  SpO2: 100% (22 Oct 2024 10:57) (99% - 100%)    Parameters below as of 22 Oct 2024 10:57  Patient On (Oxygen Delivery Method): room air        General Exam:   General Appearance: Appropriately dressed and in no acute distress       Head: Normocephalic, atraumatic and no dysmorphic features  Ear, Nose, and Throat: Moist mucous membranes  CVS: S1S2+  Resp: No SOB, no wheeze or rhonchi  GI: soft NT/ND  Extremities: No edema or cyanosis  Skin: No bruises or rashes     Neurological Exam:  Mental Status: Awake, alert and oriented x 3.  Able to follow simple and complex verbal commands. Able to name and repeat. fluent speech. No obvious aphasia or dysarthria noted.   Cranial Nerves: PERRL, EOMI, VFFC, sensation V1-V3 intact,  no obvious facial asymmetry, equal elevation of palate, scm/trap 5/5, tongue is midline on protrusion. hearing is grossly intact.   Motor: Normal bulk, tone and strength throughout. Fine finger movements were intact and symmetric. no tremors or drift noted.    Sensation: Intact to light touch and pinprick throughout. no right/left confusion. no extinction to tactile on DSS.   Reflexes: 2+ throughout at biceps, brachioradialis, triceps, patellars and ankles bilaterally and equal. No clonus. R toe and L toe were both downgoing.  Coordination: No dysmetria on FNF  Gait: deferred    Data/Labs/Imaging which I personally reviewed.     Labs:     CBC Full  -  ( 22 Oct 2024 05:56 )  WBC Count : 9.28 K/uL  RBC Count : 4.62 M/uL  Hemoglobin : 12.0 g/dL  Hematocrit : 37.9 %  Platelet Count - Automated : 405 K/uL  Mean Cell Volume : 82.0 fL  Mean Cell Hemoglobin : 26.0 pg  Mean Cell Hemoglobin Concentration : 31.7 gm/dL  Auto Neutrophil # : x  Auto Lymphocyte # : x  Auto Monocyte # : x  Auto Eosinophil # : x  Auto Basophil # : x  Auto Neutrophil % : x  Auto Lymphocyte % : x  Auto Monocyte % : x  Auto Eosinophil % : x  Auto Basophil % : x    10-22    140  |  107  |  12  ----------------------------<  110[H]  4.2   |  20[L]  |  0.61    Ca    8.7      22 Oct 2024 05:56  Phos  3.5     10-22  Mg     2.10     10-22    TPro  7.7  /  Alb  4.4  /  TBili  0.2  /  DBili  x   /  AST  21  /  ALT  21  /  AlkPhos  88  10-21    LIVER FUNCTIONS - ( 21 Oct 2024 00:24 )  Alb: 4.4 g/dL / Pro: 7.7 g/dL / ALK PHOS: 88 U/L / ALT: 21 U/L / AST: 21 U/L / GGT: x             Urinalysis Basic - ( 22 Oct 2024 05:56 )    Color: x / Appearance: x / SG: x / pH: x  Gluc: 110 mg/dL / Ketone: x  / Bili: x / Urobili: x   Blood: x / Protein: x / Nitrite: x   Leuk Esterase: x / RBC: x / WBC x   Sq Epi: x / Non Sq Epi: x / Bacteria: x          c< from: CT Head No Cont (10.21.24 @ 01:47) >    ACC: [id number] EXAM:  CT BRAIN   ORDERED BY: HERRERA CLIFTON     PROCEDURE DATE:  10/21/2024          INTERPRETATION:  CLINICAL INFORMATION: headache    COMPARISON: None.    CONTRAST:  IV Contrast: None  Complications: None    TECHNIQUE:  Serial axial images were obtained from the skull base to the   vertex using multi-slice helical technique. Sagittal and coronal   reformats were obtained.    FINDINGS:    VENTRICLES AND SULCI: Normal in size and configuration.  INTRA-AXIAL: No mass effect, acute hemorrhage, or midline shift.  EXTRA-AXIAL: No mass or fluid collection. Basal cisterns are normal in   appearance.    VISUALIZED SINUSES:  Clear.  TYMPANOMASTOID CAVITIES:  Clear.  VISUALIZED ORBITS: Normal.  CALVARIUM: Intact.    MISCELLANEOUS: None.    IMPRESSION:  No acute intracranial hemorrhage, mass effect, or midline shift.        --- End of Report ---          < end of copied text >

## 2024-10-22 NOTE — DISCHARGE NOTE NURSING/CASE MANAGEMENT/SOCIAL WORK - PATIENT PORTAL LINK FT
You can access the FollowMyHealth Patient Portal offered by [facility] by registering at the following website: [email or web address]. By joining [employer]’s FollowMyHealth portal, you will also be able to view your health information using other applications (apps) compatible with our system.

## 2024-10-22 NOTE — DISCHARGE NOTE NURSING/CASE MANAGEMENT/SOCIAL WORK - FINANCIAL ASSISTANCE
[facility] provides services at a reduced cost to those who are determined to be eligible through [facility]’s financial assistance program. Information regarding [facility]’s financial assistance program can be found by going to [email or web address] or by calling [phone].

## 2024-10-22 NOTE — DISCHARGE NOTE NURSING/CASE MANAGEMENT/SOCIAL WORK - NSDCPEFALRISK_GEN_ALL_CORE
For information on Fall & Injury Prevention, visit: [email or web address] OR  [email or web address] OR  https://www.cdc.gov/steadi/patient.html

## 2024-10-22 NOTE — CONSULT NOTE ADULT - ASSESSMENT
50 y/o F with PMHx HTN and HLD p/w chest pain and headache today. No clear history of migraines  CTH 10/21 neg for acute pathology   Initial labs with WBC 17 -> normalized, slight thrombocytosis. , a1c 6.2, b12 425, tsh wnl  TTE unrevealing   NST pending    New onset HA - possibly stress related, no other red flags  - can try migraine cocktail if no relief and no contraindications   - Migraine Tx: Reglan 10mg IV q6h, benadryl 25mg IV q6h, Toradol 30mg IV q6h   - MRI brain without contrast if no improvement in symptoms   - NST per Cardiology  - BP control   - outpatient f/u for further CASSIDY control on d/c    Amanda Tripp DO  Vascular Neurology  Office [phone]    48 y/o F with PMHx HTN and HLD p/w chest pain and headache today. No clear history of migraines  CTH 10/21 neg for acute pathology   Initial labs with WBC 17 -> normalized, slight thrombocytosis. , a1c 6.2, b12 425, tsh wnl  TTE unrevealing   NST pending  Currently HA 2/10     New onset HA - possibly stress related, no other red flags  - can try migraine cocktail if no relief and no contraindications   - Migraine Tx: Reglan 10mg IV q6h, benadryl 25mg IV q6h, Toradol 30mg IV q6h   - MRI brain without contrast if no improvement in symptoms   - NST per Cardiology  - BP control   - outpatient f/u for further CASSIDY control on d/c    Amanda Tripp DO  Vascular Neurology  Office [phone]